# Patient Record
Sex: FEMALE | Race: WHITE | NOT HISPANIC OR LATINO | URBAN - METROPOLITAN AREA
[De-identification: names, ages, dates, MRNs, and addresses within clinical notes are randomized per-mention and may not be internally consistent; named-entity substitution may affect disease eponyms.]

---

## 2018-01-09 NOTE — PROGRESS NOTES
Chief Complaint  PPD read  results 0 0 mm negative on 7/21/2016  er/cma      Active Problems    1  Encounter for PPD test (V74 1) (Z11 1)   2  Encounter for routine child health examination w/o abnormal findings (V20 2) (Z00 129)   3  Exercise-induced bronchospasm (493 81) (J45 990)   4  Hypothyroidism (244 9) (E03 9)   5  Need for diphtheria-tetanus-pertussis (Tdap) vaccine, adult/adolescent (V06 1) (Z23)    Current Meds   1  Albuterol Sulfate  MCG/ACT AERS; 2 puffs Q 4 hours prn SOB/wheeze; Therapy: 28BVJ4334 to  Requested for: 21IFT7851 Recorded   2  Levothyroxine Sodium 50 MCG Oral Tablet; TAKE 1 TABLET DAILY AS DIRECTED; Therapy: (Recorded:04Bdf0307) to Recorded   3  Prenatal Vitamins TABS; Therapy: (Recorded:88Ejf5110) to Recorded    Allergies    1   Codeine    Plan  Encounter for PPD test    · Tubersol 5 UNIT/0 1ML Intradermal Solution    Signatures   Electronically signed by : Lorrie Valerio; Jul 21 2016  4:37PM EST                       (Author)    Electronically signed by : Cheri Irving DO; Jul 21 2016  4:38PM EST                       (Author)

## 2018-01-17 NOTE — PROGRESS NOTES
Assessment   1  Encounter for preventive health examination (V70 0) (Z00 00)  2  Need for diphtheria-tetanus-pertussis (Tdap) vaccine, adult/adolescent (V06 1) (Z23)  3  Encounter for PPD test (V74 1) (Z11 1)    Plan  Encounter for PPD test    · Administered: Tubersol 5 UNIT/0 1ML Intradermal Solution  Health Maintenance    · Always use a seat belt and shoulder strap when riding or driving a motor vehicle ;  Status:Complete;   Done: 38RJG2410   · Begin a limited exercise program ; Status:Complete;   Done: 89BMU0653   · Use a sun block product with an SPF of 15 or more ; Status:Complete;   Done: 61QNL8238   · We encourage all of our patients to exercise regularly  30 minutes of exercise or physical  activity five or more days a week is recommended for children and adults ;  Status:Complete;   Done: 06MYD8279   · We recommend that you bring your body mass index down to 26 ; Status:Complete;    Done: 41GUO6499   · We recommend that you examine your own breasts for lumps and other changes ;  Status:Complete;   Done: 11MJX2705   · Call [phone] if: You have any warning signs of skin cancer ; Status:Complete;    Done: 14WBI4383  Need for diphtheria-tetanus-pertussis (Tdap) vaccine, adult/adolescent    · Administered: Tdap (Adacel)    Discussion/Summary  health maintenance visit Currently, she eats a healthy diet and has an inadequate exercise regimen  cervical cancer screening is current cervical cancer screening is needed every three years Testing was done today for up to date  Breast cancer screening: monthly self breast exam was advised  Screening lab work includes pt was labs ordered from OB  The immunizations will be given as outlined in the orders  Advice and education were given regarding nutrition, aerobic exercise, weight loss, sunscreen use and self skin examination  Possible side effects of new medications were reviewed with the patient/guardian today        Chief Complaint  CPE rmklpn      History of Present Illness  HM, Adult Female: The patient is being seen for a health maintenance evaluation  General Health: The patient's health since the last visit is described as good  She has regular dental visits  She denies vision problems  Immunizations status: not up to date The patient needs the following immunization(s): tetanus vaccine  Lifestyle:  She consumes a diverse and healthy diet  She has weight concerns  Weight control issues: overweight  She does not exercise regularly  She does not use tobacco  She consumes alcohol  She reports occasional alcohol use  Reproductive health: the patient is premenopausal   she reports normal menses  she uses no contraception  For contraception, she trying to conceive  Screening: Cervical cancer screening includes a pap smear performed April 2016  Breast cancer screening includes a clinical breast exam performed April 2016 and monthly breast self-exams performed  HPI: Here for CPE  She started working as a  at a   She sees OB/GYN in [place], starting workup for infertility  Levothyroxine managed by endo and her OB  She had a pap in April, states it was normal       Review of Systems    Constitutional: no fever, no chills and not feeling tired  Eyes: no eyesight problems  Cardiovascular: no chest pain, no palpitations and no lower extremity edema  Respiratory: no cough  Gastrointestinal: no abdominal pain, no nausea, no diarrhea and no blood in stools  Genitourinary: no dysuria and no vaginal discharge  Musculoskeletal: no joint swelling and no joint stiffness  Integumentary: no rashes and no skin lesions  Neurological: no headache, no numbness and no tingling  Psychiatric: no anxiety and no depression  Active Problems   1  Encounter for routine child health examination w/o abnormal findings (V20 2) (Z00 129)  2  Exercise-induced bronchospasm (493 81) (J45 990)  3   Hypothyroidism (244 9) (E03 9)    Past Medical History    · History of Abdominal pain, epigastric (789 06) (R10 13)   · History of Acute upper respiratory infection (465 9) (J06 9)   · Encounter for routine child health examination w/o abnormal findings (V20 2) (Z00 129)   · History of streptococcal pharyngitis (V12 09) (Z87 09)   · History of Nausea (787 02) (R11 0)   · History of Sore throat (462) (J02 9)    Family History  Mother    · Family history of gastroesophageal reflux disease (V18 59) (Z83 79)   · Family history of Hypertension (401 9) (I10)  Father    · Family history of hyperlipidemia (V18 19) (Z83 49)   · Family history of Heart attack (410 90) (I21 3)   · Family history of Hypertension (401 9) (I10)    Social History    · Never smoked   · Social alcohol use (Z78 9)    Current Meds  1  Albuterol Sulfate  MCG/ACT AERS; 2 puffs Q 4 hours prn SOB/wheeze; Therapy: 65IYU0402 to  Requested for: 53VHJ7044 Recorded  2  Levothyroxine Sodium 50 MCG Oral Tablet; TAKE 1 TABLET DAILY AS DIRECTED; Therapy: (Recorded:29Lgv9783) to Recorded  3  Prenatal Vitamins TABS; Therapy: (Recorded:20Wfb9438) to Recorded    Allergies   1  Codeine    Vitals   Recorded: 60EEA5235 59:90SS   Systolic 869   Diastolic 76   Heart Rate 88   Respiration 18   Temperature 98 2 F   Height 5 ft 3 5 in   Weight 194 lb    BMI Calculated 33 83   BSA Calculated 1 92     Physical Exam    Constitutional   General appearance: Abnormal   obese  Eyes   Conjunctiva and lids: No swelling, erythema or discharge  Pupils and irises: Equal, round, reactive to light  Ears, Nose, Mouth, and Throat   Otoscopic examination: Tympanic membranes translucent with normal light reflex  Canals patent without erythema  Nasal mucosa, septum, and turbinates: Normal without edema or erythema  Oropharynx: Normal with no erythema, edema, exudate or lesions  Neck   Neck: Supple, symmetric, trachea midline, no masses  Thyroid: Normal, no thyromegaly      Pulmonary   Respiratory effort: No increased work of breathing or signs of respiratory distress  Auscultation of lungs: Clear to auscultation  Cardiovascular   Auscultation of heart: Normal rate and rhythm, normal S1 and S2, no murmurs  Carotid pulses: 2+ bilaterally  Pedal pulses: 2+ bilaterally  Examination of extremities for edema and/or varicosities: Normal     Abdomen   Abdomen: Non-tender, no masses  Liver and spleen: No hepatomegaly or splenomegaly  Lymphatic   Palpation of lymph nodes in neck: No lymphadenopathy  Musculoskeletal   Digits and nails: Normal without clubbing or cyanosis  Joints, bones, and muscles: Normal     Range of motion: Normal     Muscle strength/tone: Normal     Skin   Skin and subcutaneous tissue: Normal without rashes or lesions  Neurologic   Cranial nerves: Cranial nerves II-XII intact  Reflexes: 2+ and symmetric  Sensation: No sensory loss  Psychiatric   Mood and affect: Normal        Results/Data  PHQ-2 Adult Depression Screening 55Fbw6916 10:42AM Vira Ravi     Test Name Result Flag Reference   PHQ-2 Adult Depression Score 0     Over the last two weeks, how often have you been bothered by any of the following problems? Little interest or pleasure in doing things: Not at all - 0  Feeling down, depressed, or hopeless: Not at all - 0   PHQ-2 Adult Depression Screening Negative         Procedure    Procedure: Visual Acuity Test    Indication: routine screening  Inforrmation supplied by a Snellen chart  Results: 20/25 in both eyes without corrective device, 20/25 in the right eye without corrective device, 20/25 in the left eye without corrective device      Attending Note  Collaborating Physician Note: Collaborating Physician: I agree with the Advanced Practitioner note        Future Appointments    Date/Time Provider Specialty Site   07/21/2016 04:45 PM 1 Brimson Nancy, Nurse Schedule  [facility]     Signatures   Electronically signed by : Iggy Wen Aldo Richardson; Jul 18 2016 10:45AM EST                       (Author)    Electronically signed by : Barrington Infante DO; Jul 18 2016 12:21PM EST                       (Author)

## 2020-11-15 ENCOUNTER — NURSE TRIAGE (OUTPATIENT)
Dept: OTHER | Facility: OTHER | Age: 30
End: 2020-11-15

## 2020-11-15 DIAGNOSIS — U07.1 COVID-19 DETERMINED BY CLINICAL DIAGNOSTIC CRITERIA: Primary | ICD-10-CM
